# Patient Record
Sex: MALE
[De-identification: names, ages, dates, MRNs, and addresses within clinical notes are randomized per-mention and may not be internally consistent; named-entity substitution may affect disease eponyms.]

---
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
## 2024-01-11 ENCOUNTER — APPOINTMENT (OUTPATIENT)
Dept: PEDIATRIC ALLERGY IMMUNOLOGY | Facility: CLINIC | Age: 19
End: 2024-01-11
Payer: COMMERCIAL

## 2024-01-11 VITALS — WEIGHT: 140 LBS

## 2024-01-11 DIAGNOSIS — L20.9 ATOPIC DERMATITIS, UNSPECIFIED: ICD-10-CM

## 2024-01-11 DIAGNOSIS — J30.9 ALLERGIC RHINITIS, UNSPECIFIED: ICD-10-CM

## 2024-01-11 PROCEDURE — 99213 OFFICE O/P EST LOW 20 MIN: CPT

## 2024-01-11 NOTE — HISTORY OF PRESENT ILLNESS
[de-identified] : 18 yr old last seen 7/23 - long history of allergic rhinitis and atopic dermatitis. Pt started Dupixent by derm  2 years ago with over 80-90% relief in AD and significant improvement in AR as well. Pt has also been on IT for AR for 4 1/2 year and also noted improved in AR even before Dupixent started. Pt still has occasional complaints with seasonal symptoms since spring but overall is better. Mild complaints with AR are noted with both cats and dogs at home Pt currently tales Allegra 180 mg qd PRN with help. - Pt now off IT since 6/22  Patient now returns 1/24 - doing wel with AD with use of Epi Cerum and occasional topical steroids

## 2024-01-11 NOTE — ASSESSMENT
[FreeTextEntry1] : 18 yrold with AD and AR with significant improvment with several years of use of Dupixent - will continue treatemetn Continue use of Epi Cerum, topical steroids and Allegra  Follow up 6 months  Total MD time spent on this encounter was 20-29 minutes.  This includes time devoted to preparing to see the patient with review of previous medical record, obtaining medical history, performing physical exam, counseling and patient education with patient and family, ordering medications and lab studies, documentation in the medical record and coordination of care.
no...

## 2024-01-11 NOTE — PHYSICAL EXAM
[Alert] : alert [Pale mucosa] : no pale mucosa [Pharyngeal erythema] : no pharyngeal erythema [Clear Rhinorrhea] : no clear rhinorrhea was seen [Wheezing] : no wheezing was heard [Normal Rate] : heart rate was normal  [Normal Cervical Lymph Nodes] : cervical [Skin Intact] : skin intact  [Patches] : no patches

## 2024-01-11 NOTE — REASON FOR VISIT
[Routine Follow-Up] : a routine follow-up visit for [Allergic Rhinitis] : allergic rhinitis [Eczema] : eczema [FreeTextEntry3] : dupixent

## 2025-07-24 ENCOUNTER — APPOINTMENT (OUTPATIENT)
Dept: INFECTIOUS DISEASE | Facility: CLINIC | Age: 20
End: 2025-07-24